# Patient Record
Sex: MALE | Race: WHITE | NOT HISPANIC OR LATINO | Employment: OTHER | URBAN - METROPOLITAN AREA
[De-identification: names, ages, dates, MRNs, and addresses within clinical notes are randomized per-mention and may not be internally consistent; named-entity substitution may affect disease eponyms.]

---

## 2024-02-04 PROBLEM — E78.5 HYPERLIPIDEMIA: Status: ACTIVE | Noted: 2024-02-04

## 2024-02-04 PROBLEM — I48.91 ATRIAL FIBRILLATION (MULTI): Status: ACTIVE | Noted: 2024-02-04

## 2024-02-04 PROBLEM — I10 HYPERTENSION: Status: ACTIVE | Noted: 2024-02-04

## 2024-02-04 RX ORDER — OMEPRAZOLE 20 MG/1
20 TABLET, DELAYED RELEASE ORAL
COMMUNITY
Start: 2015-02-26

## 2024-02-04 RX ORDER — ATENOLOL 50 MG/1
1 TABLET ORAL DAILY
COMMUNITY
Start: 2015-02-26 | End: 2024-04-11 | Stop reason: ALTCHOICE

## 2024-02-04 RX ORDER — ATORVASTATIN CALCIUM 40 MG/1
40 TABLET, FILM COATED ORAL NIGHTLY
COMMUNITY
Start: 2015-02-26

## 2024-02-05 ENCOUNTER — OFFICE VISIT (OUTPATIENT)
Dept: CARDIOLOGY | Facility: CLINIC | Age: 69
End: 2024-02-05
Payer: MEDICARE

## 2024-02-05 ENCOUNTER — LAB (OUTPATIENT)
Dept: LAB | Facility: LAB | Age: 69
End: 2024-02-05
Payer: MEDICARE

## 2024-02-05 VITALS
BODY MASS INDEX: 25.58 KG/M2 | HEIGHT: 67 IN | SYSTOLIC BLOOD PRESSURE: 126 MMHG | DIASTOLIC BLOOD PRESSURE: 89 MMHG | WEIGHT: 163 LBS | OXYGEN SATURATION: 97 %

## 2024-02-05 DIAGNOSIS — I48.4 ATYPICAL ATRIAL FLUTTER (MULTI): Primary | ICD-10-CM

## 2024-02-05 DIAGNOSIS — I48.19 PERSISTENT ATRIAL FIBRILLATION (MULTI): ICD-10-CM

## 2024-02-05 DIAGNOSIS — I48.4 ATYPICAL ATRIAL FLUTTER (MULTI): ICD-10-CM

## 2024-02-05 LAB
ERYTHROCYTE [DISTWIDTH] IN BLOOD BY AUTOMATED COUNT: 12.4 % (ref 11.5–14.5)
HCT VFR BLD AUTO: 46.7 % (ref 41–52)
HGB BLD-MCNC: 16.1 G/DL (ref 13.5–17.5)
MCH RBC QN AUTO: 31.1 PG (ref 26–34)
MCHC RBC AUTO-ENTMCNC: 34.5 G/DL (ref 32–36)
MCV RBC AUTO: 90 FL (ref 80–100)
NRBC BLD-RTO: 0 /100 WBCS (ref 0–0)
PLATELET # BLD AUTO: 267 X10*3/UL (ref 150–450)
RBC # BLD AUTO: 5.18 X10*6/UL (ref 4.5–5.9)
WBC # BLD AUTO: 7.2 X10*3/UL (ref 4.4–11.3)

## 2024-02-05 PROCEDURE — 93010 ELECTROCARDIOGRAM REPORT: CPT | Performed by: INTERNAL MEDICINE

## 2024-02-05 PROCEDURE — 3079F DIAST BP 80-89 MM HG: CPT | Performed by: NURSE PRACTITIONER

## 2024-02-05 PROCEDURE — 93005 ELECTROCARDIOGRAM TRACING: CPT | Performed by: NURSE PRACTITIONER

## 2024-02-05 PROCEDURE — 36415 COLL VENOUS BLD VENIPUNCTURE: CPT

## 2024-02-05 PROCEDURE — 1159F MED LIST DOCD IN RCRD: CPT | Performed by: NURSE PRACTITIONER

## 2024-02-05 PROCEDURE — 1160F RVW MEDS BY RX/DR IN RCRD: CPT | Performed by: NURSE PRACTITIONER

## 2024-02-05 PROCEDURE — 3074F SYST BP LT 130 MM HG: CPT | Performed by: NURSE PRACTITIONER

## 2024-02-05 PROCEDURE — 85027 COMPLETE CBC AUTOMATED: CPT

## 2024-02-05 PROCEDURE — 1126F AMNT PAIN NOTED NONE PRSNT: CPT | Performed by: NURSE PRACTITIONER

## 2024-02-05 PROCEDURE — 99214 OFFICE O/P EST MOD 30 MIN: CPT | Performed by: NURSE PRACTITIONER

## 2024-02-05 PROCEDURE — 80048 BASIC METABOLIC PNL TOTAL CA: CPT

## 2024-02-05 PROCEDURE — 1036F TOBACCO NON-USER: CPT | Performed by: NURSE PRACTITIONER

## 2024-02-05 PROCEDURE — 99204 OFFICE O/P NEW MOD 45 MIN: CPT | Performed by: NURSE PRACTITIONER

## 2024-02-05 RX ORDER — APIXABAN 5 MG/1
5 TABLET, FILM COATED ORAL 2 TIMES DAILY
COMMUNITY
Start: 2024-01-24

## 2024-02-05 RX ORDER — TRIAMTERENE AND HYDROCHLOROTHIAZIDE 37.5; 25 MG/1; MG/1
1 CAPSULE ORAL EVERY MORNING
COMMUNITY

## 2024-02-05 ASSESSMENT — ENCOUNTER SYMPTOMS
HEADACHES: 0
IRREGULAR HEARTBEAT: 0
DIZZINESS: 0
DYSPNEA ON EXERTION: 1
DEPRESSION: 0
SORE THROAT: 0
ABDOMINAL PAIN: 0
WEAKNESS: 0
PND: 0
FALLS: 0
VOMITING: 0
SNORING: 0
COUGH: 0
MYALGIAS: 0
DIAPHORESIS: 0
NAUSEA: 0
PALPITATIONS: 0
SPUTUM PRODUCTION: 0
DIARRHEA: 0
OCCASIONAL FEELINGS OF UNSTEADINESS: 0
LOSS OF SENSATION IN FEET: 0
SYNCOPE: 0
NEAR-SYNCOPE: 0
BLURRED VISION: 0
HEMOPTYSIS: 0
LIGHT-HEADEDNESS: 1
DOUBLE VISION: 0
FEVER: 0
SHORTNESS OF BREATH: 0
ORTHOPNEA: 0

## 2024-02-05 ASSESSMENT — COLUMBIA-SUICIDE SEVERITY RATING SCALE - C-SSRS
2. HAVE YOU ACTUALLY HAD ANY THOUGHTS OF KILLING YOURSELF?: NO
6. HAVE YOU EVER DONE ANYTHING, STARTED TO DO ANYTHING, OR PREPARED TO DO ANYTHING TO END YOUR LIFE?: NO
1. IN THE PAST MONTH, HAVE YOU WISHED YOU WERE DEAD OR WISHED YOU COULD GO TO SLEEP AND NOT WAKE UP?: NO

## 2024-02-05 ASSESSMENT — PATIENT HEALTH QUESTIONNAIRE - PHQ9
2. FEELING DOWN, DEPRESSED OR HOPELESS: NOT AT ALL
SUM OF ALL RESPONSES TO PHQ9 QUESTIONS 1 AND 2: 0
1. LITTLE INTEREST OR PLEASURE IN DOING THINGS: NOT AT ALL

## 2024-02-05 ASSESSMENT — PAIN SCALES - GENERAL: PAINLEVEL: 0-NO PAIN

## 2024-02-05 NOTE — PROGRESS NOTES
Current Outpatient Medications   Medication Instructions    atenolol (Tenormin) 50 mg tablet 1 tablet, oral, Daily    atorvastatin (LIPITOR) 40 mg, oral, Nightly    Eliquis 5 mg, oral, 2 times daily    omeprazole OTC (PriLOSEC OTC) 20 mg EC tablet oral    triamterene-hydrochlorothiazid (Dyazide) 37.5-25 mg capsule 1 capsule, oral, Every morning

## 2024-02-05 NOTE — PROGRESS NOTES
"Subjective   David Bacon is a 68 y.o. male.    Chief Complaint:  New Patient Visit    68 year old male presents today for evaluation of a heart arrhythmia.  PMH includes HTN, Afib s/p PVI 2012 with Dr. De Oliveira and redo PVI 3/30/2015 with Dr. Ramirez, HTN, GERD, HLD    Patient was last seen by EP in 2015 for a follow up of his Redo Afib Ablation.  He was doing well and his Pradaxa was stopped.    He had his regular follow up with his PCP on 1/2 and everything was okay. Patient states that he has been out of normal heart rhythm since 1/15/2024. He was playing [employer] and had 4 beers when he went into an arrhythmia later that night.  He also took a Cialis that night.  He monitors his heart rate with his blood pressure cuff and his rates have been 120s-140s.   Symptoms include fatigue, MALONE, and lightheadedness  He followed up with his PCP on 1/24 and was started on Eliquis 5 mg BID and his atenolol was increased to twice a day for rate control. He hasn't noticed any difference in the heart rate.  He is still up in the 120s-140s    ECG 2/5/2024 2:1 Atrial Flutter  bpm    /89 (BP Location: Right arm, Patient Position: Sitting, BP Cuff Size: Adult long)   Ht 1.702 m (5' 7\")   Wt 73.9 kg (163 lb)   SpO2 97%   BMI 25.53 kg/m²   Current Outpatient Medications on File Prior to Visit   Medication Sig Dispense Refill    atenolol (Tenormin) 50 mg tablet Take 1 tablet (50 mg) by mouth once daily.      atorvastatin (Lipitor) 40 mg tablet Take 1 tablet (40 mg) by mouth once daily at bedtime.      Eliquis 5 mg tablet Take 1 tablet (5 mg) by mouth 2 times a day.      omeprazole OTC (PriLOSEC OTC) 20 mg EC tablet Take by mouth.      triamterene-hydrochlorothiazid (Dyazide) 37.5-25 mg capsule Take 1 capsule by mouth once daily in the morning.       No current facility-administered medications on file prior to visit.         Review of Systems   Constitutional: Positive for malaise/fatigue. Negative for diaphoresis and " fever.   HENT:  Negative for congestion and sore throat.    Eyes:  Negative for blurred vision and double vision.   Cardiovascular:  Positive for dyspnea on exertion. Negative for chest pain, irregular heartbeat, leg swelling, near-syncope, orthopnea, palpitations, paroxysmal nocturnal dyspnea and syncope.   Respiratory:  Negative for cough, hemoptysis, shortness of breath, snoring and sputum production.    Hematologic/Lymphatic: Negative for bleeding problem.   Skin:  Negative for rash.   Musculoskeletal:  Negative for falls, joint pain and myalgias.   Gastrointestinal:  Negative for abdominal pain, diarrhea, nausea and vomiting.   Neurological:  Positive for light-headedness. Negative for dizziness, headaches and weakness.   All other systems reviewed and are negative.      Objective   Constitutional:       Appearance: Healthy appearance. Not in distress.   Eyes:      Conjunctiva/sclera: Conjunctivae normal.   HENT:      Nose: Nose normal.    Mouth/Throat:      Pharynx: Oropharynx is clear.   Pulmonary:      Effort: Pulmonary effort is normal.      Breath sounds: Normal breath sounds. No wheezing. No rhonchi.   Chest:      Chest wall: Not tender to palpatation.   Cardiovascular:      Tachycardia present. Regularly irregular rhythm.      Murmurs: There is no murmur.      No rub.   Pulses:     Intact distal pulses.   Edema:     Peripheral edema absent.   Abdominal:      General: Bowel sounds are normal.      Palpations: Abdomen is soft.   Musculoskeletal: Normal range of motion.      Cervical back: Neck supple. Skin:     General: Skin is warm and dry.   Neurological:      Mental Status: Alert and oriented to person, place and time.      Motor: Motor function is intact.       Assessment/Plan   The primary encounter diagnosis was Atypical atrial flutter (CMS/HCC). A diagnosis of Persistent atrial fibrillation (CMS/HCC) was also pertinent to this visit.  Patient is in Atrial Flutter today.  He has been on  anticoagulation since 1/24 and hasn't missed a dose.  Patient would prefer to wait for 3 weeks of anticoagulation than get a MONSTER cardioversion. He is fatigued, but overall still participating in his normal activities. His main symptom is fatigue, he denies any near syncope. He will get SOB at times and sometimes lightheaded, but he walked his dog yesterday taking 14,000 steps and tolerated it fine.    Treatment options discussed with the patient including rhythm control with AAD vs ablation.  The patient would prefer not to add any more medications if it can be avoided. He would prefer ablation over medication if it is necessary, risks and benefits of each discussed.    Continue Eliquis 5 mg BID uninterrupted  We will get him set up for cardioversion next week, will get CBC/BMP today  Recommend he establish with a local general cardiologist to help him monitor his heart rhythm and follow up with any cardiac needs  If the arrhythmia recurs after cardioversion, we will consider ablation for Atrial flutter

## 2024-02-06 LAB
ANION GAP SERPL CALC-SCNC: 15 MMOL/L (ref 10–20)
BUN SERPL-MCNC: 20 MG/DL (ref 6–23)
CALCIUM SERPL-MCNC: 10.2 MG/DL (ref 8.6–10.6)
CHLORIDE SERPL-SCNC: 105 MMOL/L (ref 98–107)
CO2 SERPL-SCNC: 27 MMOL/L (ref 21–32)
CREAT SERPL-MCNC: 1.23 MG/DL (ref 0.5–1.3)
EGFRCR SERPLBLD CKD-EPI 2021: 63 ML/MIN/1.73M*2
GLUCOSE SERPL-MCNC: 103 MG/DL (ref 74–99)
POTASSIUM SERPL-SCNC: 4.5 MMOL/L (ref 3.5–5.3)
SODIUM SERPL-SCNC: 142 MMOL/L (ref 136–145)

## 2024-02-10 LAB
ATRIAL RATE: 288 BPM
Q ONSET: 221 MS
QRS COUNT: 23 BEATS
QRS DURATION: 70 MS
QT INTERVAL: 316 MS
QTC CALCULATION(BAZETT): 484 MS
QTC FREDERICIA: 420 MS
R AXIS: 55 DEGREES
T AXIS: 9 DEGREES
T OFFSET: 379 MS
VENTRICULAR RATE: 141 BPM

## 2024-02-15 ENCOUNTER — ANESTHESIA (OUTPATIENT)
Dept: CARDIOLOGY | Facility: HOSPITAL | Age: 69
End: 2024-02-15
Payer: MEDICARE

## 2024-02-15 ENCOUNTER — HOSPITAL ENCOUNTER (OUTPATIENT)
Facility: HOSPITAL | Age: 69
Setting detail: OUTPATIENT SURGERY
Discharge: HOME | End: 2024-02-15
Attending: INTERNAL MEDICINE | Admitting: INTERNAL MEDICINE
Payer: MEDICARE

## 2024-02-15 ENCOUNTER — ANESTHESIA EVENT (OUTPATIENT)
Dept: CARDIOLOGY | Facility: HOSPITAL | Age: 69
End: 2024-02-15
Payer: MEDICARE

## 2024-02-15 VITALS
OXYGEN SATURATION: 96 % | SYSTOLIC BLOOD PRESSURE: 99 MMHG | DIASTOLIC BLOOD PRESSURE: 68 MMHG | RESPIRATION RATE: 12 BRPM | HEART RATE: 68 BPM

## 2024-02-15 DIAGNOSIS — I48.4 ATYPICAL ATRIAL FLUTTER (MULTI): ICD-10-CM

## 2024-02-15 PROBLEM — G89.29 CHRONIC LOW BACK PAIN: Status: ACTIVE | Noted: 2024-02-15

## 2024-02-15 PROBLEM — Z79.01 ANTICOAGULANT LONG-TERM USE: Status: ACTIVE | Noted: 2024-02-15

## 2024-02-15 PROBLEM — M54.50 CHRONIC LOW BACK PAIN: Status: ACTIVE | Noted: 2024-02-15

## 2024-02-15 PROCEDURE — 92960 CARDIOVERSION ELECTRIC EXT: CPT | Mod: XP | Performed by: INTERNAL MEDICINE

## 2024-02-15 PROCEDURE — 2720000007 HC OR 272 NO HCPCS: Performed by: INTERNAL MEDICINE

## 2024-02-15 PROCEDURE — 92960 CARDIOVERSION ELECTRIC EXT: CPT | Performed by: INTERNAL MEDICINE

## 2024-02-15 PROCEDURE — 3700000002 HC GENERAL ANESTHESIA TIME - EACH INCREMENTAL 1 MINUTE: Performed by: INTERNAL MEDICINE

## 2024-02-15 PROCEDURE — 3700000001 HC GENERAL ANESTHESIA TIME - INITIAL BASE CHARGE: Performed by: INTERNAL MEDICINE

## 2024-02-15 PROCEDURE — A92960 PR CARDIOVERSION, ELECTIVE;EXTERN: Performed by: ANESTHESIOLOGIST ASSISTANT

## 2024-02-15 PROCEDURE — 2500000004 HC RX 250 GENERAL PHARMACY W/ HCPCS (ALT 636 FOR OP/ED): Performed by: ANESTHESIOLOGIST ASSISTANT

## 2024-02-15 PROCEDURE — A92960 PR CARDIOVERSION, ELECTIVE;EXTERN: Performed by: ANESTHESIOLOGY

## 2024-02-15 RX ORDER — MULTIVIT-MIN/IRON FUM/FOLIC AC 7.5 MG-4
1 TABLET ORAL DAILY
COMMUNITY

## 2024-02-15 RX ORDER — PROPOFOL 10 MG/ML
INJECTION, EMULSION INTRAVENOUS AS NEEDED
Status: DISCONTINUED | OUTPATIENT
Start: 2024-02-15 | End: 2024-02-15

## 2024-02-15 RX ORDER — SILDENAFIL 100 MG/1
100 TABLET, FILM COATED ORAL AS NEEDED
COMMUNITY

## 2024-02-15 RX ADMIN — PROPOFOL 50 MG: 10 INJECTION, EMULSION INTRAVENOUS at 12:23

## 2024-02-15 RX ADMIN — SODIUM CHLORIDE, SODIUM LACTATE, POTASSIUM CHLORIDE, AND CALCIUM CHLORIDE: 600; 310; 30; 20 INJECTION, SOLUTION INTRAVENOUS at 11:56

## 2024-02-15 RX ADMIN — PROPOFOL 40 MG: 10 INJECTION, EMULSION INTRAVENOUS at 12:24

## 2024-02-15 SDOH — HEALTH STABILITY: MENTAL HEALTH: CURRENT SMOKER: 0

## 2024-02-15 NOTE — PROGRESS NOTES
Pharmacy Medication History Review    David Bacon is a 68 y.o. male admitted for Atypical atrial flutter (CMS/[facility]). Pharmacy reviewed the patient's [id number] medications and allergies for accuracy.    The list below reflects the updated PTA list. Comments regarding how patient may be taking medications differently can be found in the Admit Orders Activity  Prior to Admission Medications   Prescriptions Last Dose Informant Patient Reported?   Eliquis 5 mg tablet 2/15/2024 Self Yes   Sig: Take 1 tablet (5 mg) by mouth 2 times a day.   atenolol (Tenormin) 50 mg tablet 2/15/2024 Self Yes   Sig: Take 1 tablet (50 mg) by mouth once daily.   atorvastatin (Lipitor) 40 mg tablet 2/14/2024 Self Yes   Sig: Take 1 tablet (40 mg) by mouth once daily at bedtime.   multivitamin with minerals tablet 2/15/2024 Self Yes   Sig: Take 1 tablet by mouth once daily.   omeprazole OTC (PriLOSEC OTC) 20 mg EC tablet 2/15/2024 Self Yes   Sig: Take 1 tablet (20 mg) by mouth once daily in the morning. Take before meals.   sildenafil (Viagra) 100 mg tablet Past Month Self Yes   Sig: Take 1 tablet (100 mg) by mouth if needed for erectile dysfunction.   triamterene-hydrochlorothiazid (Dyazide) 37.5-25 mg capsule 2/15/2024 Self Yes   Sig: Take 1 capsule by mouth once daily in the morning.      Facility-Administered Medications: None        The list below reflects the updated allergy list. Please review each documented allergy for additional clarification and justification.  Allergies  Reviewed by Melanie Ceja RN on 2/15/2024   No Known Allergies         Patient declines M2B at discharge. Pharmacy has been updated to [facility] in [place].    Sources used to complete the med history include out patient fill history, OARRS, and patient interview- good historian.      Below are additional concerns with the patient's PTA list.  -SAGRARIO Davis, Bebeto  Transitions of Care Pharmacist   Meds [facility]  Please reach  out via Secure Chat for questions, or if no response call [phone] or [facility]

## 2024-02-15 NOTE — ANESTHESIA POSTPROCEDURE EVALUATION
Patient: David Bacon    Procedure Summary       Date: 02/15/24 Room / Location: [facility] [id number] EP PRE/POST [place] 1 / Virtual [facility] MAT 3529 Cardiac Cath Lab    Anesthesia Start: 1223 Anesthesia Stop:     Procedure: Cardioversion Diagnosis:       Atypical atrial flutter (CMS/HCC)      (Atypical atrial flutter (CMS/HCC) [I48.4])    Providers: Sadie Beckham MD Responsible Provider: Yg Farr MD    Anesthesia Type: general ASA Status: 3            Anesthesia Type: general    Vitals Value Taken Time   BP 94/65 02/15/24 1237     02/15/24 1237   Pulse 63 02/15/24 1237   Resp 15 02/15/24 1237   SpO2 97 02/15/24 1237       Anesthesia Post Evaluation    Patient location during evaluation: PACU  Patient participation: complete - patient participated  Level of consciousness: awake  Pain management: adequate  Airway patency: patent  Cardiovascular status: acceptable  Respiratory status: acceptable  Hydration status: acceptable  Postoperative Nausea and Vomiting: none  Comments: Patient SV on 10 L/min O2 w/SFM.  VSS.      No notable events documented.  Patient is somnolent but arousable.  No current complaints;  PACU nurse at bedside.  Pain reasonably controlled.  Normothermic.  Euvolemic and hemodynamically stable.  Stable respiratory status;  no current nausea or emesis.  Indicated PACU care given.    Yg Farr MD

## 2024-02-15 NOTE — ANESTHESIA PREPROCEDURE EVALUATION
Patient: David Bacon    Procedure Information       Date/Time: 02/15/24 1200    Procedure: Cardioversion - on or after 2/15/24, started AC 1/24/2024    Location: [address] PRE/POST [place] 1 / [facility] Cardiac Cath Lab    Providers: Timoteo Ramirez MD        ALLERGIES:  NKDA  ANESTHESIA HX:  Denies complications.  Denies FH of MH  NPO STATUS:  midnight    Relevant Problems   Cardiovascular   (+) Atrial fibrillation (CMS/HCC)   (+) Atypical atrial flutter (CMS/HCC)   (+) Hyperlipidemia   (+) Hypertension      Endocrine (within normal limits)      GI (within normal limits)      /Renal (within normal limits)      Neuro/Psych (within normal limits)      Pulmonary (within normal limits)      GI/Hepatic (within normal limits)      Hematology   (+) Anticoagulant long-term use      Musculoskeletal   (+) Chronic low back pain      Infectious Disease (within normal limits)       Clinical information reviewed:    Allergies                NPO Detail:  NPO/Void Status  Date of Last Liquid: 02/14/24  Date of Last Solid: 02/14/24         Physical Exam    Airway  Mallampati: II  TM distance: >3 FB  Neck ROM: full     Cardiovascular    Dental    Pulmonary    Abdominal            Anesthesia Plan    History of general anesthesia?: yes  History of complications of general anesthesia?: no    ASA 3     general     The patient is not a current smoker.    intravenous induction   Postoperative administration of opioids is intended.  Trial extubation is planned.  Anesthetic plan and risks discussed with patient.  Use of blood products discussed with patient who consented to blood products.    Risks, benefits, and alternatives to TIVA general anesthesia with the patient's natural airway coupled with supplemental oxygen was explained to the patient.  The patient indicated understanding and agreed to proceed.    Yg Farr MD

## 2024-02-19 NOTE — H&P
History Of Present Illness  David Bacon is a 68 y.o. male presenting with  afib presented for Cardioversion .     Past Medical History  History reviewed. No pertinent past medical history.    Surgical History  Past Surgical History:   Procedure Laterality Date    CARDIAC ELECTROPHYSIOLOGY PROCEDURE N/A 2/15/2024    Procedure: Cardioversion;  Surgeon: Sadie Beckham MD;  Location: [place] [id number] Cardiac Cath Lab;  Service: Electrophysiology;  Laterality: N/A;  on or after 2/15/24, started AC 1/24/2024        Social History  He reports that he quit smoking about 53 years ago. His smoking use included cigarettes. He has never used smokeless tobacco. He reports current alcohol use. He reports that he does not use drugs.    Family History  No family history on file.     Allergies  Patient has no known allergies.    Review of Systems   All other systems reviewed and are negative.       Physical Exam  Constitutional:       Appearance: Normal appearance.   Cardiovascular:      Pulses: Normal pulses.      Heart sounds: Normal heart sounds.   Pulmonary:      Effort: Pulmonary effort is normal.      Breath sounds: Normal breath sounds.   Abdominal:      General: Abdomen is flat.      Palpations: Abdomen is soft.   Neurological:      Mental Status: He is alert.   Psychiatric:         Mood and Affect: Mood normal.          Last Recorded Vitals  Blood pressure 99/68, pulse 68, resp. rate 12, SpO2 96 %.    Relevant Results             Assessment/Plan   Principal Problem:    Atypical atrial flutter (CMS/HCC)  Active Problems:    Anticoagulant long-term use    Chronic low back pain               Radha Shetty MD

## 2024-04-11 ENCOUNTER — OFFICE VISIT (OUTPATIENT)
Dept: CARDIOLOGY | Facility: CLINIC | Age: 69
End: 2024-04-11
Payer: MEDICARE

## 2024-04-11 ENCOUNTER — TELEPHONE (OUTPATIENT)
Dept: CARDIOLOGY | Facility: CLINIC | Age: 69
End: 2024-04-11

## 2024-04-11 VITALS
BODY MASS INDEX: 25.24 KG/M2 | WEIGHT: 160.8 LBS | HEART RATE: 70 BPM | HEIGHT: 67 IN | DIASTOLIC BLOOD PRESSURE: 94 MMHG | SYSTOLIC BLOOD PRESSURE: 148 MMHG

## 2024-04-11 DIAGNOSIS — R06.02 SHORTNESS OF BREATH: ICD-10-CM

## 2024-04-11 DIAGNOSIS — Z79.01 ANTICOAGULANT LONG-TERM USE: ICD-10-CM

## 2024-04-11 DIAGNOSIS — E78.2 MIXED HYPERLIPIDEMIA: ICD-10-CM

## 2024-04-11 DIAGNOSIS — I48.4 ATYPICAL ATRIAL FLUTTER (MULTI): ICD-10-CM

## 2024-04-11 DIAGNOSIS — Z87.891 FORMER SMOKER: ICD-10-CM

## 2024-04-11 DIAGNOSIS — I10 HYPERTENSION, UNSPECIFIED TYPE: ICD-10-CM

## 2024-04-11 DIAGNOSIS — I48.19 PERSISTENT ATRIAL FIBRILLATION (MULTI): Primary | ICD-10-CM

## 2024-04-11 PROBLEM — Z79.899 HIGH RISK MEDICATION USE: Status: ACTIVE | Noted: 2024-04-11

## 2024-04-11 PROBLEM — E66.3 OVERWEIGHT (BMI 25.0-29.9): Status: ACTIVE | Noted: 2024-04-11

## 2024-04-11 PROBLEM — N52.8 OTHER MALE ERECTILE DYSFUNCTION: Status: ACTIVE | Noted: 2024-04-11

## 2024-04-11 PROCEDURE — 3080F DIAST BP >= 90 MM HG: CPT | Performed by: INTERNAL MEDICINE

## 2024-04-11 PROCEDURE — 1160F RVW MEDS BY RX/DR IN RCRD: CPT | Performed by: INTERNAL MEDICINE

## 2024-04-11 PROCEDURE — 1036F TOBACCO NON-USER: CPT | Performed by: INTERNAL MEDICINE

## 2024-04-11 PROCEDURE — 1159F MED LIST DOCD IN RCRD: CPT | Performed by: INTERNAL MEDICINE

## 2024-04-11 PROCEDURE — 3077F SYST BP >= 140 MM HG: CPT | Performed by: INTERNAL MEDICINE

## 2024-04-11 PROCEDURE — 99204 OFFICE O/P NEW MOD 45 MIN: CPT | Performed by: INTERNAL MEDICINE

## 2024-04-11 PROCEDURE — 93000 ELECTROCARDIOGRAM COMPLETE: CPT | Performed by: INTERNAL MEDICINE

## 2024-04-11 RX ORDER — NEBIVOLOL 5 MG/1
5 TABLET ORAL DAILY
Qty: 30 TABLET | Refills: 5 | Status: SHIPPED | OUTPATIENT
Start: 2024-04-11 | End: 2024-05-16 | Stop reason: SDUPTHER

## 2024-04-11 ASSESSMENT — ENCOUNTER SYMPTOMS: DIZZINESS: 1

## 2024-04-11 NOTE — PROGRESS NOTES
Cardiology Consultation- New Consult    Reason for referral: History of atrial fibrillation    HPI: David Bacon is a 68 y.o. male with known history of atrial fibrillation/flutter.  He was seen remotely by Dr. Mayo.  Cardioversion was not successful and ultimately underwent initial ablation by Dr. Dao back in 2015 with repeat ablation I believe in 2018 by Dr. Ramirez.  He recently had recurrence for which she was treated with anticoagulation and followed by cardioversion.  Patient believes the use of Viagra may have triggered his atrial fibrillation.  He is concerned about atenolol causing male erectile dysfunction.  He described functional class I.  He denies complaint of chest pain, palpitation, lightheadedness, dizziness or syncope.  He did report shortness of breath during his episode of atrial fibrillation.  No recent echocardiograms available.    Assessment    1.  Atrial fibrillation status post ablation x 2 with recent recurrence of atrial flutter status post cardioversion remain in normal sinus rhythm no recurrence over the last few month  2.  Hypertension seem to be running a little bit on the high range  3.  Erectile male dysfunction the patient concerned about treatment with beta-blocker  4.  Anticoagulation due to atrial fibrillation/flutter well-tolerated and  5.  BMI of 25  6.  Mild shortness of breath during atrial fibrillation    Plan    1.  I suggested the patient to switch from atenolol to diastolic 5 mg daily and to come back for a blood pressure check in 6 weeks and continue to uptitrate Bystolic to control blood pressure  2.  I advised him if his male erectile dysfunction does not improve with the switching to Bystolic and if he is concerned about sildenafil triggering episode of atrial fibrillation we may consider switching him to the tadalafil  3.  I advised him to have an echocardiogram  4.  I recommended 6-month follow-up      Past Medical History:   He has a past medical history of  "Abnormal ECG, Atrial fibrillation (CMS/HCC) (1/15/2024), and Hypertension.    Surgical History:   He has a past surgical history that includes Cardiac electrophysiology procedure (N/A, 2/15/2024).    Family History:   No family history on file.    Social History:   Social History     Tobacco Use    Smoking status: Former     Current packs/day: 0.00     Types: Cigarettes     Quit date: 1971     Years since quittin.3    Smokeless tobacco: Never   Substance Use Topics    Alcohol use: Yes     Alcohol/week: 80.0 standard drinks of alcohol     Types: 80 Cans of beer per week     Comment: socially        Allergies:  Patient has no known allergies.     Current Medications:    Current Outpatient Medications:     atorvastatin (Lipitor) 40 mg tablet, Take 1 tablet (40 mg) by mouth once daily at bedtime., Disp: , Rfl:     Eliquis 5 mg tablet, Take 1 tablet (5 mg) by mouth 2 times a day., Disp: , Rfl:     multivitamin with minerals tablet, Take 1 tablet by mouth once daily., Disp: , Rfl:     omeprazole OTC (PriLOSEC OTC) 20 mg EC tablet, Take 1 tablet (20 mg) by mouth once daily in the morning. Take before meals., Disp: , Rfl:     sildenafil (Viagra) 100 mg tablet, Take 1 tablet (100 mg) by mouth if needed for erectile dysfunction., Disp: , Rfl:     triamterene-hydrochlorothiazid (Dyazide) 37.5-25 mg capsule, Take 1 capsule by mouth once daily in the morning., Disp: , Rfl:     nebivolol (Bystolic) 5 mg tablet, Take 1 tablet (5 mg) by mouth once daily., Disp: 30 tablet, Rfl: 5     Vitals:  Vitals:    24 1455   BP: (!) 148/94   BP Location: Left arm   Patient Position: Sitting   Pulse: 70   Weight: 72.9 kg (160 lb 12.8 oz)   Height: 1.702 m (5' 7\")      EKG done in office today     Review of Systems   Neurological:  Positive for dizziness.   All other systems reviewed and are negative.      Objective         Physical Exam  Constitutional:       Appearance: Normal appearance.   HENT:      Nose: Nose normal.   Neck:      " Vascular: No carotid bruit.   Cardiovascular:      Rate and Rhythm: Normal rate.      Pulses: Normal pulses.      Heart sounds: Normal heart sounds.   Pulmonary:      Effort: Pulmonary effort is normal.   Abdominal:      General: Bowel sounds are normal.      Palpations: Abdomen is soft.   Musculoskeletal:         General: Normal range of motion.      Cervical back: Normal range of motion.      Right lower leg: No edema.      Left lower leg: No edema.   Skin:     General: Skin is warm and dry.   Neurological:      General: No focal deficit present.      Mental Status: He is alert.   Psychiatric:         Mood and Affect: Mood normal.         Behavior: Behavior normal.         Thought Content: Thought content normal.         Judgment: Judgment normal.                Assessment and Plan:   1. Persistent atrial fibrillation (CMS/HCC)  Referral to Cardiology    Follow Up In Cardiology    ECG 12 Lead    nebivolol (Bystolic) 5 mg tablet    Transthoracic Echo Complete      2. Atypical atrial flutter (CMS/HCC)  Referral to Cardiology      3. Hypertension, unspecified type  nebivolol (Bystolic) 5 mg tablet    Follow Up In Cardiology      4. Shortness of breath  Transthoracic Echo Complete      5. Mixed hyperlipidemia        6. Anticoagulant long-term use        7. Former smoker               Scribe Attestation  By signing my name below, Angeles QUIGLEY LPN, Scribe   attest that this documentation has been prepared under the direction and in the presence of Lindsay Gallagher MD.    Provider Attestation - Scribe documentation    All medical record entries made by the Scribe were at my direction and personally dictated by me. I have reviewed the chart and agree that the record accurately reflects my personal performance of the history, physical exam, discussion and plan.

## 2024-04-11 NOTE — LETTER
April 11, 2024     David Lord MD  [address] [facility] [address]    Patient: David Bacon   YOB: 1955   Date of Visit: 4/11/2024       Dear Dr. David Lord MD:    Thank you for referring David Bacon to me for evaluation. Below are my notes for this consultation.  If you have questions, please do not hesitate to call me. I look forward to following your patient along with you.       Sincerely,     Lindsay Gallagher MD      CC: No Recipients  ______________________________________________________________________________________    Cardiology Consultation- New Consult    Reason for referral: History of atrial fibrillation    HPI: David Bacon is a 68 y.o. male with known history of atrial fibrillation/flutter.  He was seen remotely by Dr. Mayo.  Cardioversion was not successful and ultimately underwent initial ablation by Dr. Dao back in 2015 with repeat ablation I believe in 2018 by Dr. Ramirez.  He recently had recurrence for which she was treated with anticoagulation and followed by cardioversion.  Patient believes the use of Viagra may have triggered his atrial fibrillation.  He is concerned about atenolol causing male erectile dysfunction.  He described functional class I.  He denies complaint of chest pain, palpitation, lightheadedness, dizziness or syncope.  He did report shortness of breath during his episode of atrial fibrillation.  No recent echocardiograms available.    Assessment    1.  Atrial fibrillation status post ablation x 2 with recent recurrence of atrial flutter status post cardioversion remain in normal sinus rhythm no recurrence over the last few month  2.  Hypertension seem to be running a little bit on the high range  3.  Erectile male dysfunction the patient concerned about treatment with beta-blocker  4.  Anticoagulation due to atrial fibrillation/flutter well-tolerated and  5.  BMI of 25  6.  Mild shortness of  breath during atrial fibrillation    Plan    1.  I suggested the patient to switch from atenolol to diastolic 5 mg daily and to come back for a blood pressure check in 6 weeks and continue to uptitrate Bystolic to control blood pressure  2.  I advised him if his male erectile dysfunction does not improve with the switching to Bystolic and if he is concerned about sildenafil triggering episode of atrial fibrillation we may consider switching him to the tadalafil  3.  I advised him to have an echocardiogram  4.  I recommended 6-month follow-up      Past Medical History:   He has a past medical history of Abnormal ECG, Atrial fibrillation (CMS/HCC) (1/15/2024), and Hypertension.    Surgical History:   He has a past surgical history that includes Cardiac electrophysiology procedure (N/A, 2/15/2024).    Family History:   No family history on file.    Social History:   Social History     Tobacco Use   • Smoking status: Former     Current packs/day: 0.00     Types: Cigarettes     Quit date:      Years since quittin.3   • Smokeless tobacco: Never   Substance Use Topics   • Alcohol use: Yes     Alcohol/week: 80.0 standard drinks of alcohol     Types: 80 Cans of beer per week     Comment: socially        Allergies:  Patient has no known allergies.     Current Medications:    Current Outpatient Medications:   •  atorvastatin (Lipitor) 40 mg tablet, Take 1 tablet (40 mg) by mouth once daily at bedtime., Disp: , Rfl:   •  Eliquis 5 mg tablet, Take 1 tablet (5 mg) by mouth 2 times a day., Disp: , Rfl:   •  multivitamin with minerals tablet, Take 1 tablet by mouth once daily., Disp: , Rfl:   •  omeprazole OTC (PriLOSEC OTC) 20 mg EC tablet, Take 1 tablet (20 mg) by mouth once daily in the morning. Take before meals., Disp: , Rfl:   •  sildenafil (Viagra) 100 mg tablet, Take 1 tablet (100 mg) by mouth if needed for erectile dysfunction., Disp: , Rfl:   •  triamterene-hydrochlorothiazid (Dyazide) 37.5-25 mg capsule, Take 1  "capsule by mouth once daily in the morning., Disp: , Rfl:   •  nebivolol (Bystolic) 5 mg tablet, Take 1 tablet (5 mg) by mouth once daily., Disp: 30 tablet, Rfl: 5     Vitals:  Vitals:    04/11/24 1455   BP: (!) 148/94   BP Location: Left arm   Patient Position: Sitting   Pulse: 70   Weight: 72.9 kg (160 lb 12.8 oz)   Height: 1.702 m (5' 7\")      EKG done in office today     Review of Systems   Neurological:  Positive for dizziness.   All other systems reviewed and are negative.      Objective        Physical Exam  Constitutional:       Appearance: Normal appearance.   HENT:      Nose: Nose normal.   Neck:      Vascular: No carotid bruit.   Cardiovascular:      Rate and Rhythm: Normal rate.      Pulses: Normal pulses.      Heart sounds: Normal heart sounds.   Pulmonary:      Effort: Pulmonary effort is normal.   Abdominal:      General: Bowel sounds are normal.      Palpations: Abdomen is soft.   Musculoskeletal:         General: Normal range of motion.      Cervical back: Normal range of motion.      Right lower leg: No edema.      Left lower leg: No edema.   Skin:     General: Skin is warm and dry.   Neurological:      General: No focal deficit present.      Mental Status: He is alert.   Psychiatric:         Mood and Affect: Mood normal.         Behavior: Behavior normal.         Thought Content: Thought content normal.         Judgment: Judgment normal.                Assessment and Plan:   1. Persistent atrial fibrillation (CMS/HCC)  Referral to Cardiology    Follow Up In Cardiology    ECG 12 Lead    nebivolol (Bystolic) 5 mg tablet    Transthoracic Echo Complete      2. Atypical atrial flutter (CMS/HCC)  Referral to Cardiology      3. Hypertension, unspecified type  nebivolol (Bystolic) 5 mg tablet    Follow Up In Cardiology      4. Shortness of breath  Transthoracic Echo Complete      5. Mixed hyperlipidemia        6. Anticoagulant long-term use        7. Former smoker               Scribe Attestation  By " signing my name below, I, Angeles ROME LPN, Scribe   attest that this documentation has been prepared under the direction and in the presence of Lindsay Gallagher MD.    Provider Attestation - Scribe documentation    All medical record entries made by the Scribe were at my direction and personally dictated by me. I have reviewed the chart and agree that the record accurately reflects my personal performance of the history, physical exam, discussion and plan.

## 2024-04-11 NOTE — PATIENT INSTRUCTIONS
Please bring all medicines, vitamins, and herbal supplements with you when you come to the office.    Prescriptions will not be filled unless you are compliant with your follow up appointments or have a follow up appointment scheduled as per instruction of your physician. Refills should be requested at the time of your visit.     BMI was above normal measurement. Current weight: 72.9 kg (160 lb 12.8 oz)  Weight change since last visit (-) denotes wt loss -2.2 lbs   Weight loss needed to achieve BMI 25: 1.5 Lbs  Weight loss needed to achieve BMI 30: -30.3 Lbs        Stop Atenolol  Start Bystolic 5 mg one daily  Cialis discussed.  Echo  Follow up 3 months

## 2024-04-11 NOTE — TELEPHONE ENCOUNTER
Patient phoned inquiring about the length of time he would need to be on Eliquis therapy. Forgot to ask at his OV. Please advise    To Dr. Lindsay Gallagher MD for review.

## 2024-04-26 ENCOUNTER — HOSPITAL ENCOUNTER (OUTPATIENT)
Dept: CARDIOLOGY | Facility: CLINIC | Age: 69
Discharge: HOME | End: 2024-04-26
Payer: MEDICARE

## 2024-04-26 VITALS
DIASTOLIC BLOOD PRESSURE: 86 MMHG | BODY MASS INDEX: 25.11 KG/M2 | HEIGHT: 67 IN | SYSTOLIC BLOOD PRESSURE: 142 MMHG | WEIGHT: 160 LBS

## 2024-04-26 DIAGNOSIS — I48.19 PERSISTENT ATRIAL FIBRILLATION (MULTI): ICD-10-CM

## 2024-04-26 DIAGNOSIS — R06.02 SHORTNESS OF BREATH: ICD-10-CM

## 2024-04-26 LAB
AORTIC VALVE MEAN GRADIENT: 5 MMHG
AORTIC VALVE PEAK VELOCITY: 1.52 M/S
AV PEAK GRADIENT: 9.2 MMHG
AVA (PEAK VEL): 2 CM2
AVA (VTI): 1.93 CM2
EJECTION FRACTION APICAL 4 CHAMBER: 60.6
LEFT VENTRICLE INTERNAL DIMENSION DIASTOLE: 4.14 CM (ref 3.5–6)
LEFT VENTRICULAR OUTFLOW TRACT DIAMETER: 2.1 CM
MITRAL VALVE E/A RATIO: 1.76
MITRAL VALVE E/E' RATIO: 13.6
RIGHT VENTRICLE PEAK SYSTOLIC PRESSURE: 20.3 MMHG

## 2024-04-26 PROCEDURE — 93306 TTE W/DOPPLER COMPLETE: CPT

## 2024-04-26 PROCEDURE — 93306 TTE W/DOPPLER COMPLETE: CPT | Performed by: INTERNAL MEDICINE

## 2024-04-29 ENCOUNTER — TELEPHONE (OUTPATIENT)
Dept: CARDIOLOGY | Facility: CLINIC | Age: 69
End: 2024-04-29
Payer: MEDICARE

## 2024-04-29 NOTE — TELEPHONE ENCOUNTER
----- Message from Lindsay Gallagher MD sent at 4/28/2024  4:36 PM EDT -----  Advise echo showed good heart function  ----- Message -----  From: Sirisha, Syngo - Cardiology Results In  Sent: 4/26/2024   5:14 PM EDT  To: Lindsay Gallagher MD

## 2024-05-16 ENCOUNTER — OFFICE VISIT (OUTPATIENT)
Dept: CARDIOLOGY | Facility: CLINIC | Age: 69
End: 2024-05-16
Payer: MEDICARE

## 2024-05-16 VITALS
SYSTOLIC BLOOD PRESSURE: 142 MMHG | HEIGHT: 67 IN | BODY MASS INDEX: 24.33 KG/M2 | HEART RATE: 72 BPM | DIASTOLIC BLOOD PRESSURE: 92 MMHG | WEIGHT: 155 LBS

## 2024-05-16 DIAGNOSIS — I10 HYPERTENSION, UNSPECIFIED TYPE: Primary | ICD-10-CM

## 2024-05-16 DIAGNOSIS — I48.19 PERSISTENT ATRIAL FIBRILLATION (MULTI): ICD-10-CM

## 2024-05-16 PROCEDURE — 99212 OFFICE O/P EST SF 10 MIN: CPT | Performed by: INTERNAL MEDICINE

## 2024-05-16 PROCEDURE — 3077F SYST BP >= 140 MM HG: CPT | Performed by: INTERNAL MEDICINE

## 2024-05-16 PROCEDURE — 1160F RVW MEDS BY RX/DR IN RCRD: CPT | Performed by: INTERNAL MEDICINE

## 2024-05-16 PROCEDURE — 1159F MED LIST DOCD IN RCRD: CPT | Performed by: INTERNAL MEDICINE

## 2024-05-16 PROCEDURE — 3079F DIAST BP 80-89 MM HG: CPT | Performed by: INTERNAL MEDICINE

## 2024-05-16 PROCEDURE — 1036F TOBACCO NON-USER: CPT | Performed by: INTERNAL MEDICINE

## 2024-05-16 RX ORDER — NEBIVOLOL 10 MG/1
10 TABLET ORAL DAILY
Qty: 90 TABLET | Refills: 3 | Status: SHIPPED | OUTPATIENT
Start: 2024-05-16

## 2024-05-16 NOTE — PROGRESS NOTES
"Subjective   David Bacon is a 68 y.o. male       Chief Complaint    Follow-up          HPI   Patient is here for follow-up continue management for hypertension.  Recently switching from atenolol to Bystolic.  Blood pressure remained mildly elevated.  He is tolerating treatment well.    Assessment    1.  Hypertension remains suboptimally controlled    Plan    Increase Bystolicto 10 mg daily and come back for a blood pressure check in 6 to 8 weeks  ROS         Vitals:    05/16/24 1335 05/16/24 1338   BP: 156/88 (!) 142/92   BP Location: Left arm Right arm   Patient Position: Sitting Sitting   Pulse: 72    Weight: 70.3 kg (155 lb)    Height: 1.702 m (5' 7\")         Objective   Physical Exam    Allergies  Patient has no known allergies.     Current Medications    Current Outpatient Medications:     atorvastatin (Lipitor) 40 mg tablet, Take 1 tablet (40 mg) by mouth once daily at bedtime., Disp: , Rfl:     Eliquis 5 mg tablet, Take 1 tablet (5 mg) by mouth 2 times a day., Disp: , Rfl:     multivitamin with minerals tablet, Take 1 tablet by mouth once daily., Disp: , Rfl:     omeprazole OTC (PriLOSEC OTC) 20 mg EC tablet, Take 1 tablet (20 mg) by mouth once daily in the morning. Take before meals., Disp: , Rfl:     sildenafil (Viagra) 100 mg tablet, Take 1 tablet (100 mg) by mouth if needed for erectile dysfunction., Disp: , Rfl:     triamterene-hydrochlorothiazid (Dyazide) 37.5-25 mg capsule, Take 1 capsule by mouth once daily in the morning., Disp: , Rfl:     nebivolol (Bystolic) 10 mg tablet, Take 1 tablet (10 mg) by mouth once daily., Disp: 90 tablet, Rfl: 3                     Assessment/Plan   1. Hypertension, unspecified type  Follow Up In Cardiology    nebivolol (Bystolic) 10 mg tablet      2. Persistent atrial fibrillation (Multi)  nebivolol (Bystolic) 10 mg tablet               Scribe Attestation  By signing my name below, Virgen QUIGLEY LPN  , Scribe   attest that this documentation has been prepared under the " direction and in the presence of Lindsay Gallagher MD.     Provider Attestation - Scribe documentation    All medical record entries made by the Scribe were at my direction and personally dictated by me. I have reviewed the chart and agree that the record accurately reflects my personal performance of the history, physical exam, discussion and plan.

## 2024-07-10 ENCOUNTER — APPOINTMENT (OUTPATIENT)
Dept: CARDIOLOGY | Facility: CLINIC | Age: 69
End: 2024-07-10
Payer: MEDICARE

## 2024-08-19 ENCOUNTER — APPOINTMENT (OUTPATIENT)
Dept: CARDIOLOGY | Facility: CLINIC | Age: 69
End: 2024-08-19
Payer: MEDICARE

## 2024-08-19 VITALS
BODY MASS INDEX: 24.01 KG/M2 | SYSTOLIC BLOOD PRESSURE: 118 MMHG | DIASTOLIC BLOOD PRESSURE: 72 MMHG | HEART RATE: 68 BPM | WEIGHT: 153 LBS | HEIGHT: 67 IN

## 2024-08-19 DIAGNOSIS — I48.19 PERSISTENT ATRIAL FIBRILLATION (MULTI): ICD-10-CM

## 2024-08-19 DIAGNOSIS — E78.2 MIXED HYPERLIPIDEMIA: ICD-10-CM

## 2024-08-19 DIAGNOSIS — Z87.891 FORMER SMOKER: ICD-10-CM

## 2024-08-19 DIAGNOSIS — I48.4 ATYPICAL ATRIAL FLUTTER (MULTI): Primary | ICD-10-CM

## 2024-08-19 DIAGNOSIS — Z79.01 ANTICOAGULANT LONG-TERM USE: ICD-10-CM

## 2024-08-19 DIAGNOSIS — I10 PRIMARY HYPERTENSION: ICD-10-CM

## 2024-08-19 DIAGNOSIS — R06.02 SHORTNESS OF BREATH: ICD-10-CM

## 2024-08-19 PROBLEM — E66.3 OVERWEIGHT (BMI 25.0-29.9): Status: RESOLVED | Noted: 2024-04-11 | Resolved: 2024-08-19

## 2024-08-19 PROCEDURE — 1159F MED LIST DOCD IN RCRD: CPT | Performed by: INTERNAL MEDICINE

## 2024-08-19 PROCEDURE — 99214 OFFICE O/P EST MOD 30 MIN: CPT | Performed by: INTERNAL MEDICINE

## 2024-08-19 PROCEDURE — 1160F RVW MEDS BY RX/DR IN RCRD: CPT | Performed by: INTERNAL MEDICINE

## 2024-08-19 PROCEDURE — 3074F SYST BP LT 130 MM HG: CPT | Performed by: INTERNAL MEDICINE

## 2024-08-19 PROCEDURE — 1036F TOBACCO NON-USER: CPT | Performed by: INTERNAL MEDICINE

## 2024-08-19 PROCEDURE — 3078F DIAST BP <80 MM HG: CPT | Performed by: INTERNAL MEDICINE

## 2024-08-19 PROCEDURE — 3008F BODY MASS INDEX DOCD: CPT | Performed by: INTERNAL MEDICINE

## 2024-08-19 RX ORDER — DABIGATRAN ETEXILATE 150 MG/1
150 CAPSULE ORAL 2 TIMES DAILY
Qty: 180 CAPSULE | Refills: 3 | Status: SHIPPED | OUTPATIENT
Start: 2024-08-19 | End: 2025-08-19

## 2024-08-19 NOTE — PATIENT INSTRUCTIONS
Please bring all medicines, vitamins, and herbal supplements with you when you come to the office.    Prescriptions will not be filled unless you are compliant with your follow up appointments or have a follow up appointment scheduled as per instruction of your physician. Refills should be requested at the time of your visit.    No

## 2024-08-19 NOTE — LETTER
August 19, 2024     David Lord MD  [address] [facility] [address]    Patient: David Bacon   YOB: 1955   Date of Visit: 8/19/2024       Dear Dr. David Lord MD:    Thank you for referring David Bacon to me for evaluation. Below are my notes for this consultation.  If you have questions, please do not hesitate to call me. I look forward to following your patient along with you.       Sincerely,     Lindsay Gallagher MD      CC: No Recipients  ______________________________________________________________________________________    Subjective   David Bacon is a 68 y.o. male       Chief Complaint    Follow-up          HPI   Patient is here for follow-up continue management for atrial fibrillation/flutter with prior ablation on 2 occasion last in 2017.  He had a recurrence last year and underwent cardioversion.  He remained in normal sinus rhythm.  He denies complaint of chest pain, palpitation, lightheadedness, dizziness or syncope.  He remains active.  His recent laboratory data noted and reviewed with him.  His only concern is the cost of Eliquis.    Assessment     1.  Atrial fibrillation status post ablation x 2 with recent recurrence of atrial flutter status post cardioversion remain in normal sinus rhythm no recurrence over the last 8-month  2.  Hypertension well-controlled  3.  Erectile male dysfunction seems to have improved following switching him to Bystolic  4.  Anticoagulation due to atrial fibrillation/flutter well-tolerated but concerned about the cost of Eliquis  5.  BMI of 23  6.  Mild shortness of breath during atrial fibrillation.  Resolved with no recurrence     Plan     1.  I suggested the patient to switch from Eliquis to Pradaxa for cost reason  2.  I reviewed his recent lab with him  3.  I reviewed with him his recent echocardiogram which showed normal LV systolic function  4.  I recommended 6-month follow-up with  "consideration to switch to aspirin if remain in sinus rhythm.  Will do an EKG next office visit     Review of Systems   All other systems reviewed and are negative.           Vitals:    08/19/24 0841   BP: 118/72   BP Location: Right arm   Patient Position: Sitting   Pulse: 68   Weight: 69.4 kg (153 lb)   Height: 1.702 m (5' 7\")        Objective   Physical Exam  Constitutional:       Appearance: Normal appearance.   HENT:      Nose: Nose normal.   Neck:      Vascular: No carotid bruit.   Cardiovascular:      Rate and Rhythm: Normal rate.      Pulses: Normal pulses.      Heart sounds: Normal heart sounds.   Pulmonary:      Effort: Pulmonary effort is normal.   Abdominal:      General: Bowel sounds are normal.      Palpations: Abdomen is soft.   Musculoskeletal:         General: Normal range of motion.      Cervical back: Normal range of motion.      Right lower leg: No edema.      Left lower leg: No edema.   Skin:     General: Skin is warm and dry.   Neurological:      General: No focal deficit present.      Mental Status: He is alert.   Psychiatric:         Mood and Affect: Mood normal.         Behavior: Behavior normal.         Thought Content: Thought content normal.         Judgment: Judgment normal.         Allergies  Patient has no known allergies.     Current Medications    Current Outpatient Medications:   •  atorvastatin (Lipitor) 40 mg tablet, Take 1 tablet (40 mg) by mouth once daily at bedtime., Disp: , Rfl:   •  multivitamin with minerals tablet, Take 1 tablet by mouth once daily., Disp: , Rfl:   •  nebivolol (Bystolic) 10 mg tablet, Take 1 tablet (10 mg) by mouth once daily., Disp: 90 tablet, Rfl: 3  •  omeprazole OTC (PriLOSEC OTC) 20 mg EC tablet, Take 1 tablet (20 mg) by mouth once daily in the morning. Take before meals., Disp: , Rfl:   •  sildenafil (Viagra) 100 mg tablet, Take 1 tablet (100 mg) by mouth if needed for erectile dysfunction., Disp: , Rfl:   •  triamterene-hydrochlorothiazid (Dyazide) " 37.5-25 mg capsule, Take 1 capsule by mouth once daily in the morning., Disp: , Rfl:   •  dabigatran etexilate (Pradaxa) 150 mg capsule, Take 1 capsule (150 mg) by mouth 2 times a day. Do not crush or chew., Disp: 180 capsule, Rfl: 3                     Assessment/Plan   1. Atypical atrial flutter (Multi)        2. Persistent atrial fibrillation (Multi)  Follow Up In Cardiology    Follow Up In Cardiology    dabigatran etexilate (Pradaxa) 150 mg capsule      3. Primary hypertension        4. Mixed hyperlipidemia        5. Anticoagulant long-term use        6. Shortness of breath        7. Former smoker        8. BMI 23.0-23.9, adult                 Scribe Attestation  By signing my name below, I, Jessica PENALOZA LPN  , Scribe   attest that this documentation has been prepared under the direction and in the presence of Lindsay Gallagher MD.     Provider Attestation - Scribe documentation    All medical record entries made by the Scribe were at my direction and personally dictated by me. I have reviewed the chart and agree that the record accurately reflects my personal performance of the history, physical exam, discussion and plan.

## 2024-08-19 NOTE — PROGRESS NOTES
"Subjective   David Bacon is a 68 y.o. male       Chief Complaint    Follow-up          HPI   Patient is here for follow-up continue management for atrial fibrillation/flutter with prior ablation on 2 occasion last in 2017.  He had a recurrence last year and underwent cardioversion.  He remained in normal sinus rhythm.  He denies complaint of chest pain, palpitation, lightheadedness, dizziness or syncope.  He remains active.  His recent laboratory data noted and reviewed with him.  His only concern is the cost of Eliquis.    Assessment     1.  Atrial fibrillation status post ablation x 2 with recent recurrence of atrial flutter status post cardioversion remain in normal sinus rhythm no recurrence over the last 8-month  2.  Hypertension well-controlled  3.  Erectile male dysfunction seems to have improved following switching him to Bystolic  4.  Anticoagulation due to atrial fibrillation/flutter well-tolerated but concerned about the cost of Eliquis  5.  BMI of 23  6.  Mild shortness of breath during atrial fibrillation.  Resolved with no recurrence     Plan     1.  I suggested the patient to switch from Eliquis to Pradaxa for cost reason  2.  I reviewed his recent lab with him  3.  I reviewed with him his recent echocardiogram which showed normal LV systolic function  4.  I recommended 6-month follow-up with consideration to switch to aspirin if remain in sinus rhythm.  Will do an EKG next office visit     Review of Systems   All other systems reviewed and are negative.           Vitals:    08/19/24 0841   BP: 118/72   BP Location: Right arm   Patient Position: Sitting   Pulse: 68   Weight: 69.4 kg (153 lb)   Height: 1.702 m (5' 7\")        Objective   Physical Exam  Constitutional:       Appearance: Normal appearance.   HENT:      Nose: Nose normal.   Neck:      Vascular: No carotid bruit.   Cardiovascular:      Rate and Rhythm: Normal rate.      Pulses: Normal pulses.      Heart sounds: Normal heart sounds. "   Pulmonary:      Effort: Pulmonary effort is normal.   Abdominal:      General: Bowel sounds are normal.      Palpations: Abdomen is soft.   Musculoskeletal:         General: Normal range of motion.      Cervical back: Normal range of motion.      Right lower leg: No edema.      Left lower leg: No edema.   Skin:     General: Skin is warm and dry.   Neurological:      General: No focal deficit present.      Mental Status: He is alert.   Psychiatric:         Mood and Affect: Mood normal.         Behavior: Behavior normal.         Thought Content: Thought content normal.         Judgment: Judgment normal.         Allergies  Patient has no known allergies.     Current Medications    Current Outpatient Medications:     atorvastatin (Lipitor) 40 mg tablet, Take 1 tablet (40 mg) by mouth once daily at bedtime., Disp: , Rfl:     multivitamin with minerals tablet, Take 1 tablet by mouth once daily., Disp: , Rfl:     nebivolol (Bystolic) 10 mg tablet, Take 1 tablet (10 mg) by mouth once daily., Disp: 90 tablet, Rfl: 3    omeprazole OTC (PriLOSEC OTC) 20 mg EC tablet, Take 1 tablet (20 mg) by mouth once daily in the morning. Take before meals., Disp: , Rfl:     sildenafil (Viagra) 100 mg tablet, Take 1 tablet (100 mg) by mouth if needed for erectile dysfunction., Disp: , Rfl:     triamterene-hydrochlorothiazid (Dyazide) 37.5-25 mg capsule, Take 1 capsule by mouth once daily in the morning., Disp: , Rfl:     dabigatran etexilate (Pradaxa) 150 mg capsule, Take 1 capsule (150 mg) by mouth 2 times a day. Do not crush or chew., Disp: 180 capsule, Rfl: 3                     Assessment/Plan   1. Atypical atrial flutter (Multi)        2. Persistent atrial fibrillation (Multi)  Follow Up In Cardiology    Follow Up In Cardiology    dabigatran etexilate (Pradaxa) 150 mg capsule      3. Primary hypertension        4. Mixed hyperlipidemia        5. Anticoagulant long-term use        6. Shortness of breath        7. Former smoker        8.  BMI 23.0-23.9, adult                 Scribe Attestation  By signing my name below, I, Jessica PENALOZA LPN  , Scribe   attest that this documentation has been prepared under the direction and in the presence of Lindsay Gallagher MD.     Provider Attestation - Scribe documentation    All medical record entries made by the Scribe were at my direction and personally dictated by me. I have reviewed the chart and agree that the record accurately reflects my personal performance of the history, physical exam, discussion and plan.

## 2025-02-24 ENCOUNTER — APPOINTMENT (OUTPATIENT)
Dept: CARDIOLOGY | Facility: CLINIC | Age: 70
End: 2025-02-24
Payer: MEDICARE

## 2025-02-24 VITALS
HEART RATE: 66 BPM | DIASTOLIC BLOOD PRESSURE: 70 MMHG | HEIGHT: 67 IN | BODY MASS INDEX: 24.8 KG/M2 | SYSTOLIC BLOOD PRESSURE: 132 MMHG | WEIGHT: 158 LBS

## 2025-02-24 DIAGNOSIS — I10 PRIMARY HYPERTENSION: ICD-10-CM

## 2025-02-24 DIAGNOSIS — I48.19 PERSISTENT ATRIAL FIBRILLATION (MULTI): ICD-10-CM

## 2025-02-24 DIAGNOSIS — I48.4 ATYPICAL ATRIAL FLUTTER (MULTI): ICD-10-CM

## 2025-02-24 DIAGNOSIS — R06.02 SHORTNESS OF BREATH: ICD-10-CM

## 2025-02-24 DIAGNOSIS — Z87.891 FORMER SMOKER: ICD-10-CM

## 2025-02-24 DIAGNOSIS — Z79.01 ANTICOAGULANT LONG-TERM USE: ICD-10-CM

## 2025-02-24 DIAGNOSIS — E78.2 MIXED HYPERLIPIDEMIA: ICD-10-CM

## 2025-02-24 PROBLEM — Z79.899 HIGH RISK MEDICATION USE: Status: RESOLVED | Noted: 2024-04-11 | Resolved: 2025-02-24

## 2025-02-24 PROCEDURE — 1159F MED LIST DOCD IN RCRD: CPT | Performed by: INTERNAL MEDICINE

## 2025-02-24 PROCEDURE — 3008F BODY MASS INDEX DOCD: CPT | Performed by: INTERNAL MEDICINE

## 2025-02-24 PROCEDURE — 3078F DIAST BP <80 MM HG: CPT | Performed by: INTERNAL MEDICINE

## 2025-02-24 PROCEDURE — 93000 ELECTROCARDIOGRAM COMPLETE: CPT | Performed by: INTERNAL MEDICINE

## 2025-02-24 PROCEDURE — G2211 COMPLEX E/M VISIT ADD ON: HCPCS | Performed by: INTERNAL MEDICINE

## 2025-02-24 PROCEDURE — 1160F RVW MEDS BY RX/DR IN RCRD: CPT | Performed by: INTERNAL MEDICINE

## 2025-02-24 PROCEDURE — 3075F SYST BP GE 130 - 139MM HG: CPT | Performed by: INTERNAL MEDICINE

## 2025-02-24 PROCEDURE — 99214 OFFICE O/P EST MOD 30 MIN: CPT | Performed by: INTERNAL MEDICINE

## 2025-02-24 PROCEDURE — 1036F TOBACCO NON-USER: CPT | Performed by: INTERNAL MEDICINE

## 2025-02-24 RX ORDER — DABIGATRAN ETEXILATE 150 MG/1
150 CAPSULE ORAL 2 TIMES DAILY
Qty: 180 CAPSULE | Refills: 1 | Status: SHIPPED | OUTPATIENT
Start: 2025-02-24 | End: 2026-02-24

## 2025-02-24 NOTE — PROGRESS NOTES
"Subjective   David Bacon is a 69 y.o. male       Chief Complaint    Follow-up          HPI   Patient is here for follow-up to management for history of atrial fibrillation/flutter with prior ablation on 2 occasion with post ablation recurrence requiring cardioversion.  His last recurrence was more than a year ago.  Since last time I saw him he reports is feeling well.  He denies any complaint of chest pain, palpitation, lightheadedness, dizziness or syncope.  He remains active.    Assessment     1.  Atrial fibrillation/flutter status post ablation x 2 with recent recurrence of atrial flutter status post cardioversion remain in normal sinus rhythm no recurrence for more than 1 year  2.  Hypertension well-controlled  3.  Erectile male dysfunction seems to have improved following switching him to Bystolic  4.  Anticoagulation due to atrial fibrillation/flutter well-tolerated but concerned about the cost of Eliquis  5.  BMI of 23  6.  Mild shortness of breath during atrial fibrillation.  Resolved with no recurrence     Plan     1.  I reviewed the result of his lab work with him  2.  I discussed with him anticoagulation at great length.  We discussed aspirin for versus Pradaxa considering he has not had any recurrence in more than a year and concerning his relatively low risk following the discussion the patient elected to remain on Pradaxa for now but he will revisit the issue in 6-month  3.  I reviewed with him his recent echocardiogram which showed normal LV systolic function    Review of Systems   All other systems reviewed and are negative.           Vitals:    02/24/25 0911   BP: 132/70   BP Location: Right arm   Patient Position: Sitting   Pulse: 66   Weight: 71.7 kg (158 lb)   Height: 1.702 m (5' 7\")    EKG done in office today     Objective   Physical Exam  Constitutional:       Appearance: Normal appearance.   HENT:      Nose: Nose normal.   Neck:      Vascular: No carotid bruit.   Cardiovascular:      Rate " and Rhythm: Normal rate.      Pulses: Normal pulses.      Heart sounds: Normal heart sounds.   Pulmonary:      Effort: Pulmonary effort is normal.   Abdominal:      General: Bowel sounds are normal.      Palpations: Abdomen is soft.   Musculoskeletal:         General: Normal range of motion.      Cervical back: Normal range of motion.      Right lower leg: No edema.      Left lower leg: No edema.   Skin:     General: Skin is warm and dry.   Neurological:      General: No focal deficit present.      Mental Status: He is alert.   Psychiatric:         Mood and Affect: Mood normal.         Behavior: Behavior normal.         Thought Content: Thought content normal.         Judgment: Judgment normal.         Allergies  Patient has no known allergies.     Current Medications    Current Outpatient Medications:     atorvastatin (Lipitor) 40 mg tablet, Take 1 tablet (40 mg) by mouth once daily at bedtime., Disp: , Rfl:     dabigatran etexilate (Pradaxa) 150 mg capsule, Take 1 capsule (150 mg) by mouth 2 times a day. Do not crush or chew., Disp: 180 capsule, Rfl: 3    multivitamin with minerals tablet, Take 1 tablet by mouth once daily., Disp: , Rfl:     nebivolol (Bystolic) 10 mg tablet, Take 1 tablet (10 mg) by mouth once daily., Disp: 90 tablet, Rfl: 3    omeprazole OTC (PriLOSEC OTC) 20 mg EC tablet, Take 1 tablet (20 mg) by mouth once daily in the morning. Take before meals., Disp: , Rfl:     sildenafil (Viagra) 100 mg tablet, Take 1 tablet (100 mg) by mouth if needed for erectile dysfunction., Disp: , Rfl:     triamterene-hydrochlorothiazid (Dyazide) 37.5-25 mg capsule, Take 1 capsule by mouth once daily in the morning., Disp: , Rfl:                      Assessment/Plan   1. Persistent atrial fibrillation (Multi)  Follow Up In Cardiology      2. Atypical atrial flutter (Multi)        3. Anticoagulant long-term use        4. Primary hypertension        5. Mixed hyperlipidemia        6. Shortness of breath        7. BMI  24.0-24.9, adult        8. Former smoker                 Scribe Attestation  By signing my name below, I, Virgen PADILLA LPN  , Scribe   attest that this documentation has been prepared under the direction and in the presence of Lindsay Gallagher MD.     Provider Attestation - Scribe documentation    All medical record entries made by the Scribe were at my direction and personally dictated by me. I have reviewed the chart and agree that the record accurately reflects my personal performance of the history, physical exam, discussion and plan.

## 2025-02-24 NOTE — LETTER
February 24, 2025     David Lord MD  [address] [facility] [address]    Patient: David Bacon   YOB: 1955   Date of Visit: 2/24/2025       Dear Dr. David Lord MD:    Thank you for referring David Bacon to me for evaluation. Below are my notes for this consultation.  If you have questions, please do not hesitate to call me. I look forward to following your patient along with you.       Sincerely,     Lindsay Gallagher MD      CC: No Recipients  ______________________________________________________________________________________    Subjective   David Bacon is a 69 y.o. male       Chief Complaint    Follow-up          HPI   Patient is here for follow-up to management for history of atrial fibrillation/flutter with prior ablation on 2 occasion with post ablation recurrence requiring cardioversion.  His last recurrence was more than a year ago.  Since last time I saw him he reports is feeling well.  He denies any complaint of chest pain, palpitation, lightheadedness, dizziness or syncope.  He remains active.    Assessment     1.  Atrial fibrillation/flutter status post ablation x 2 with recent recurrence of atrial flutter status post cardioversion remain in normal sinus rhythm no recurrence for more than 1 year  2.  Hypertension well-controlled  3.  Erectile male dysfunction seems to have improved following switching him to Bystolic  4.  Anticoagulation due to atrial fibrillation/flutter well-tolerated but concerned about the cost of Eliquis  5.  BMI of 23  6.  Mild shortness of breath during atrial fibrillation.  Resolved with no recurrence     Plan     1.  I reviewed the result of his lab work with him  2.  I discussed with him anticoagulation at great length.  We discussed aspirin for versus Pradaxa considering he has not had any recurrence in more than a year and concerning his relatively low risk following the discussion the patient  "elected to remain on Pradaxa for now but he will revisit the issue in 6-month  3.  I reviewed with him his recent echocardiogram which showed normal LV systolic function    Review of Systems   All other systems reviewed and are negative.           Vitals:    02/24/25 0911   BP: 132/70   BP Location: Right arm   Patient Position: Sitting   Pulse: 66   Weight: 71.7 kg (158 lb)   Height: 1.702 m (5' 7\")    EKG done in office today     Objective   Physical Exam  Constitutional:       Appearance: Normal appearance.   HENT:      Nose: Nose normal.   Neck:      Vascular: No carotid bruit.   Cardiovascular:      Rate and Rhythm: Normal rate.      Pulses: Normal pulses.      Heart sounds: Normal heart sounds.   Pulmonary:      Effort: Pulmonary effort is normal.   Abdominal:      General: Bowel sounds are normal.      Palpations: Abdomen is soft.   Musculoskeletal:         General: Normal range of motion.      Cervical back: Normal range of motion.      Right lower leg: No edema.      Left lower leg: No edema.   Skin:     General: Skin is warm and dry.   Neurological:      General: No focal deficit present.      Mental Status: He is alert.   Psychiatric:         Mood and Affect: Mood normal.         Behavior: Behavior normal.         Thought Content: Thought content normal.         Judgment: Judgment normal.         Allergies  Patient has no known allergies.     Current Medications    Current Outpatient Medications:   •  atorvastatin (Lipitor) 40 mg tablet, Take 1 tablet (40 mg) by mouth once daily at bedtime., Disp: , Rfl:   •  dabigatran etexilate (Pradaxa) 150 mg capsule, Take 1 capsule (150 mg) by mouth 2 times a day. Do not crush or chew., Disp: 180 capsule, Rfl: 3  •  multivitamin with minerals tablet, Take 1 tablet by mouth once daily., Disp: , Rfl:   •  nebivolol (Bystolic) 10 mg tablet, Take 1 tablet (10 mg) by mouth once daily., Disp: 90 tablet, Rfl: 3  •  omeprazole OTC (PriLOSEC OTC) 20 mg EC tablet, Take 1 " tablet (20 mg) by mouth once daily in the morning. Take before meals., Disp: , Rfl:   •  sildenafil (Viagra) 100 mg tablet, Take 1 tablet (100 mg) by mouth if needed for erectile dysfunction., Disp: , Rfl:   •  triamterene-hydrochlorothiazid (Dyazide) 37.5-25 mg capsule, Take 1 capsule by mouth once daily in the morning., Disp: , Rfl:                      Assessment/Plan   1. Persistent atrial fibrillation (Multi)  Follow Up In Cardiology      2. Atypical atrial flutter (Multi)        3. Anticoagulant long-term use        4. Primary hypertension        5. Mixed hyperlipidemia        6. Shortness of breath        7. BMI 24.0-24.9, adult        8. Former smoker                 Scribe Attestation  By signing my name below, I, Virgen PADILLA LPN  , Scribe   attest that this documentation has been prepared under the direction and in the presence of Lindsay Gallagher MD.     Provider Attestation - Scribe documentation    All medical record entries made by the Scribe were at my direction and personally dictated by me. I have reviewed the chart and agree that the record accurately reflects my personal performance of the history, physical exam, discussion and plan.

## 2025-05-06 DIAGNOSIS — I10 HYPERTENSION, UNSPECIFIED TYPE: ICD-10-CM

## 2025-05-06 DIAGNOSIS — I48.19 PERSISTENT ATRIAL FIBRILLATION (MULTI): ICD-10-CM

## 2025-05-06 RX ORDER — NEBIVOLOL 10 MG/1
10 TABLET ORAL DAILY
Qty: 90 TABLET | Refills: 3 | Status: SHIPPED | OUTPATIENT
Start: 2025-05-06

## 2025-06-09 ENCOUNTER — TELEPHONE (OUTPATIENT)
Dept: CARDIOLOGY | Facility: CLINIC | Age: 70
End: 2025-06-09
Payer: MEDICARE

## 2025-06-09 NOTE — TELEPHONE ENCOUNTER
Voicemail from [facility] requesting 4 day Pradaxa hold prior to dental procedure; patient is not yet scheduled.     Phone:  [phone]  Fax: [phone]

## 2025-08-12 DIAGNOSIS — I48.19 PERSISTENT ATRIAL FIBRILLATION (MULTI): ICD-10-CM

## 2025-08-12 RX ORDER — DABIGATRAN ETEXILATE 150 MG/1
CAPSULE ORAL
Qty: 180 CAPSULE | Refills: 3 | Status: SHIPPED | OUTPATIENT
Start: 2025-08-12

## 2025-08-27 ENCOUNTER — APPOINTMENT (OUTPATIENT)
Dept: CARDIOLOGY | Facility: CLINIC | Age: 70
End: 2025-08-27
Payer: MEDICARE

## 2025-08-27 VITALS
HEIGHT: 67 IN | SYSTOLIC BLOOD PRESSURE: 132 MMHG | HEART RATE: 64 BPM | DIASTOLIC BLOOD PRESSURE: 74 MMHG | BODY MASS INDEX: 24.01 KG/M2 | WEIGHT: 153 LBS

## 2025-08-27 DIAGNOSIS — I10 PRIMARY HYPERTENSION: ICD-10-CM

## 2025-08-27 DIAGNOSIS — Z87.891 FORMER SMOKER: ICD-10-CM

## 2025-08-27 DIAGNOSIS — E78.2 MIXED HYPERLIPIDEMIA: ICD-10-CM

## 2025-08-27 DIAGNOSIS — I48.19 PERSISTENT ATRIAL FIBRILLATION (MULTI): Primary | ICD-10-CM

## 2025-08-27 DIAGNOSIS — I48.4 ATYPICAL ATRIAL FLUTTER: ICD-10-CM

## 2025-08-27 DIAGNOSIS — Z79.01 ANTICOAGULANT LONG-TERM USE: ICD-10-CM

## 2025-08-27 DIAGNOSIS — R06.02 SHORTNESS OF BREATH: ICD-10-CM

## 2025-08-27 PROCEDURE — 3075F SYST BP GE 130 - 139MM HG: CPT | Performed by: INTERNAL MEDICINE

## 2025-08-27 PROCEDURE — 1159F MED LIST DOCD IN RCRD: CPT | Performed by: INTERNAL MEDICINE

## 2025-08-27 PROCEDURE — 99214 OFFICE O/P EST MOD 30 MIN: CPT | Performed by: INTERNAL MEDICINE

## 2025-08-27 PROCEDURE — 3008F BODY MASS INDEX DOCD: CPT | Performed by: INTERNAL MEDICINE

## 2025-08-27 PROCEDURE — 1036F TOBACCO NON-USER: CPT | Performed by: INTERNAL MEDICINE

## 2025-08-27 PROCEDURE — 3078F DIAST BP <80 MM HG: CPT | Performed by: INTERNAL MEDICINE

## 2025-08-27 PROCEDURE — G2211 COMPLEX E/M VISIT ADD ON: HCPCS | Performed by: INTERNAL MEDICINE

## 2026-05-06 ENCOUNTER — APPOINTMENT (OUTPATIENT)
Dept: CARDIOLOGY | Facility: CLINIC | Age: 71
End: 2026-05-06
Payer: MEDICARE

## (undated) DEVICE — ELECTRODE, QUICK-PACE, EDGE RTS, COMBO